# Patient Record
Sex: FEMALE | HISPANIC OR LATINO | Employment: UNEMPLOYED | URBAN - METROPOLITAN AREA
[De-identification: names, ages, dates, MRNs, and addresses within clinical notes are randomized per-mention and may not be internally consistent; named-entity substitution may affect disease eponyms.]

---

## 2023-04-24 ENCOUNTER — OFFICE VISIT (OUTPATIENT)
Dept: OBGYN CLINIC | Facility: CLINIC | Age: 48
End: 2023-04-24

## 2023-04-24 VITALS — SYSTOLIC BLOOD PRESSURE: 197 MMHG | DIASTOLIC BLOOD PRESSURE: 93 MMHG | HEART RATE: 76 BPM | WEIGHT: 192.8 LBS

## 2023-04-24 DIAGNOSIS — Z11.3 ROUTINE SCREENING FOR STI (SEXUALLY TRANSMITTED INFECTION): ICD-10-CM

## 2023-04-24 DIAGNOSIS — Z72.51 HIGH RISK HETEROSEXUAL BEHAVIOR: ICD-10-CM

## 2023-04-24 DIAGNOSIS — Z12.31 BREAST CANCER SCREENING BY MAMMOGRAM: ICD-10-CM

## 2023-04-24 DIAGNOSIS — N93.9 ABNORMAL UTERINE BLEEDING (AUB): ICD-10-CM

## 2023-04-24 DIAGNOSIS — Z11.3 SCREEN FOR STD (SEXUALLY TRANSMITTED DISEASE): ICD-10-CM

## 2023-04-24 DIAGNOSIS — Z01.419 VISIT FOR GYNECOLOGIC EXAMINATION: Primary | ICD-10-CM

## 2023-04-24 NOTE — PROGRESS NOTES
"[address]  PROBLEM VISIT    SUBJECTIVE:  [facility] interpretor Diane Cortez [id number] used for English interpretation   HPI: Marizol Mccray is a 57 y o  [de-identified] female who presents to establish care  Hasn't been to the OBGYN in at least 10 years, in her home country of [place]  Her concern today is regarding painful menses  Her menses are regular, but not always the same date  The last about 3 days  First day is heaviest, changes pad about 10x/day, but only 4 of those are fully soaked  On second day just passes a few clots  On the first day, BP \"goes down\" and \"I don't feel well  \" Doesn't go to work that day  Feels like she's going to faint  Takes Tylenol for occasional cramps  Patient's last menstrual period was 2023  Was a normal period for her  Denies other significant PMH  Denies daily medications other than an antibiotic she occasionally takes for \"when I get an infection down there  \" Her mom sends it from [facility], [place]  Past surgical history includes: none     Reports 0 prior pregnancies  Was told in [facility] [place] that she has \"blocked tubes  \" Never had surgery to remove the tubes  Currently sexually active with her boyfriend, but not often recently because of the pain she experiences with penetration  She was told she has a cyst in her ovary in [facility] [place]  History reviewed  No pertinent past medical history  OB History    Para Term  AB Living   0 0 0 0 0 0   SAB IAB Ectopic Multiple Live Births   0 0 0 0 0       History reviewed  No pertinent surgical history  Social History     Tobacco Use   • Smoking status: Never   • Smokeless tobacco: Never   Vaping Use   • Vaping Use: Never used   Substance Use Topics   • Alcohol use: Never   • Drug use: Never       No current outpatient medications on file      OBJECTIVE:  Vitals:    23 0835   BP: (!) 197/93   Pulse: 76   Weight: 87 5 kg (192 lb 12 8 oz)       Physical Exam  Vitals " reviewed  Constitutional:       General: She is not in acute distress  Appearance: She is well-developed  HENT:      Head: Normocephalic and atraumatic  Eyes:      Conjunctiva/sclera: Conjunctivae normal    Cardiovascular:      Rate and Rhythm: Normal rate  Pulmonary:      Effort: Pulmonary effort is normal    Abdominal:      General: There is no distension  Palpations: Abdomen is soft  Tenderness: There is abdominal tenderness (mild tederness in LLQ)  Genitourinary:         Comments: Normal appearing external genitalia, vaginal mucosa, and cervix  Evidence of small 1 5 cm periclitoral, mobile, fluctuant cyst, slightly tender to palpation  No evidence of vaginal bleeding  Normal physiologic discharge in the vaginal vault  On bimanual exam, nonenlarged, mobile, anteverted uterus with no palpable adnexal masses  No CMT  Musculoskeletal:         General: Normal range of motion  Skin:     General: Skin is warm and dry  Findings: Rash present  Rash is urticarial           Neurological:      General: No focal deficit present  Mental Status: She is alert and oriented to person, place, and time  Mental status is at baseline  Psychiatric:         Mood and Affect: Mood normal          Behavior: Behavior normal          Thought Content: Thought content normal          ASSESSMENT:  Jessy Severance is a 57 y o  [de-identified] female who presents as new patient to establish care and discuss painful menses  Normal pelvic exam except for left periclitoral cyst noted  Patient states she has had this for last 3 years; she occasionally pops it for relief, and it later returns  Elevated BP noted today  Patient asymptomatic  PLAN:  - Pap, GC collected today   - STI labs ordered  - AUB labs ordered  - Pelvic ultrasound   - RTC 3 weeks to discuss results  Consider performing EMB at follow up visit     - consider I&D of periclitoral cyst if bothersome to patient, but does not appear infected - Referral to PCP for hypertension and lesion on neck     Chloe Christianson MD   PGY-4, OBGYN  04/24/23

## 2023-04-25 LAB
C TRACH DNA SPEC QL NAA+PROBE: NEGATIVE
N GONORRHOEA DNA SPEC QL NAA+PROBE: NEGATIVE

## 2023-04-26 LAB
HPV HR 12 DNA CVX QL NAA+PROBE: NEGATIVE
HPV16 DNA CVX QL NAA+PROBE: NEGATIVE
HPV18 DNA CVX QL NAA+PROBE: NEGATIVE

## 2023-04-27 ENCOUNTER — TELEPHONE (OUTPATIENT)
Dept: OBGYN CLINIC | Facility: CLINIC | Age: 48
End: 2023-04-27

## 2023-04-27 NOTE — TELEPHONE ENCOUNTER
Called patient to provide STI result, unable to leave message to return call due to voicemail not being set up, please inform patient if she returns call

## 2023-04-27 NOTE — TELEPHONE ENCOUNTER
----- Message from Penny Serrato MD sent at 4/25/2023  8:38 PM EDT -----  Patient's GC testing is negative  Please notify and remind her to complete outpatient labs and schedule pelvic US  Please schedule f/u visit in 3-4 weeks after these items are completed

## 2023-05-02 LAB
LAB AP GYN PRIMARY INTERPRETATION: NORMAL
Lab: NORMAL

## 2023-05-08 ENCOUNTER — TELEPHONE (OUTPATIENT)
Dept: OBGYN CLINIC | Facility: CLINIC | Age: 48
End: 2023-05-08

## 2023-05-08 NOTE — TELEPHONE ENCOUNTER
----- Message from Ciara Miller MD sent at 5/5/2023  4:37 PM EDT -----  Patient's pap smear was negative  Please notify